# Patient Record
Sex: MALE | Race: WHITE | NOT HISPANIC OR LATINO | Employment: FULL TIME | URBAN - METROPOLITAN AREA
[De-identification: names, ages, dates, MRNs, and addresses within clinical notes are randomized per-mention and may not be internally consistent; named-entity substitution may affect disease eponyms.]

---

## 2020-06-09 ENCOUNTER — OFFICE VISIT (OUTPATIENT)
Dept: INTERNAL MEDICINE CLINIC | Facility: CLINIC | Age: 25
End: 2020-06-09
Payer: COMMERCIAL

## 2020-06-09 VITALS
HEART RATE: 75 BPM | SYSTOLIC BLOOD PRESSURE: 110 MMHG | DIASTOLIC BLOOD PRESSURE: 68 MMHG | WEIGHT: 205 LBS | TEMPERATURE: 97.8 F | BODY MASS INDEX: 30.36 KG/M2 | HEIGHT: 69 IN | OXYGEN SATURATION: 100 %

## 2020-06-09 DIAGNOSIS — R10.11 RIGHT UPPER QUADRANT ABDOMINAL PAIN: ICD-10-CM

## 2020-06-09 DIAGNOSIS — Z11.4 SCREENING FOR HIV (HUMAN IMMUNODEFICIENCY VIRUS): ICD-10-CM

## 2020-06-09 DIAGNOSIS — R51.9 FACIAL PAIN: Primary | ICD-10-CM

## 2020-06-09 PROCEDURE — 99243 OFF/OP CNSLTJ NEW/EST LOW 30: CPT | Performed by: INTERNAL MEDICINE

## 2020-06-09 PROCEDURE — 1036F TOBACCO NON-USER: CPT | Performed by: INTERNAL MEDICINE

## 2020-06-09 PROCEDURE — 3008F BODY MASS INDEX DOCD: CPT | Performed by: INTERNAL MEDICINE

## 2020-06-09 RX ORDER — SERTRALINE HYDROCHLORIDE 100 MG/1
1 TABLET, FILM COATED ORAL DAILY
COMMUNITY
Start: 2020-01-02

## 2020-06-10 ENCOUNTER — TRANSCRIBE ORDERS (OUTPATIENT)
Dept: ADMINISTRATIVE | Facility: HOSPITAL | Age: 25
End: 2020-06-10

## 2020-06-11 LAB
ALBUMIN SERPL-MCNC: 4.5 G/DL (ref 4.1–5.2)
ALBUMIN/GLOB SERPL: 1.8 {RATIO} (ref 1.2–2.2)
ALP SERPL-CCNC: 106 IU/L (ref 39–117)
ALT SERPL-CCNC: 29 IU/L (ref 0–44)
AST SERPL-CCNC: 21 IU/L (ref 0–40)
BASOPHILS # BLD AUTO: 0.1 X10E3/UL (ref 0–0.2)
BASOPHILS NFR BLD AUTO: 1 %
BILIRUB SERPL-MCNC: 0.3 MG/DL (ref 0–1.2)
BUN SERPL-MCNC: 19 MG/DL (ref 6–20)
BUN/CREAT SERPL: 18 (ref 9–20)
CALCIUM SERPL-MCNC: 9.5 MG/DL (ref 8.7–10.2)
CHLORIDE SERPL-SCNC: 100 MMOL/L (ref 96–106)
CHOLEST SERPL-MCNC: 189 MG/DL (ref 100–199)
CO2 SERPL-SCNC: 24 MMOL/L (ref 20–29)
CREAT SERPL-MCNC: 1.03 MG/DL (ref 0.76–1.27)
EOSINOPHIL # BLD AUTO: 0.2 X10E3/UL (ref 0–0.4)
EOSINOPHIL NFR BLD AUTO: 3 %
ERYTHROCYTE [DISTWIDTH] IN BLOOD BY AUTOMATED COUNT: 13.7 % (ref 11.6–15.4)
GLOBULIN SER-MCNC: 2.5 G/DL (ref 1.5–4.5)
GLUCOSE SERPL-MCNC: 94 MG/DL (ref 65–99)
HBA1C MFR BLD: 5.7 % (ref 4.8–5.6)
HCT VFR BLD AUTO: 44.3 % (ref 37.5–51)
HDLC SERPL-MCNC: 65 MG/DL
HGB BLD-MCNC: 14.7 G/DL (ref 13–17.7)
HIV 1+2 AB+HIV1 P24 AG SERPL QL IA: NON REACTIVE
IMM GRANULOCYTES # BLD: 0 X10E3/UL (ref 0–0.1)
IMM GRANULOCYTES NFR BLD: 0 %
LDLC SERPL CALC-MCNC: 103 MG/DL (ref 0–99)
LYMPHOCYTES # BLD AUTO: 2.4 X10E3/UL (ref 0.7–3.1)
LYMPHOCYTES NFR BLD AUTO: 33 %
MCH RBC QN AUTO: 28.4 PG (ref 26.6–33)
MCHC RBC AUTO-ENTMCNC: 33.2 G/DL (ref 31.5–35.7)
MCV RBC AUTO: 86 FL (ref 79–97)
MONOCYTES # BLD AUTO: 0.6 X10E3/UL (ref 0.1–0.9)
MONOCYTES NFR BLD AUTO: 8 %
NEUTROPHILS # BLD AUTO: 4 X10E3/UL (ref 1.4–7)
NEUTROPHILS NFR BLD AUTO: 55 %
PLATELET # BLD AUTO: 241 X10E3/UL (ref 150–450)
POTASSIUM SERPL-SCNC: 4.5 MMOL/L (ref 3.5–5.2)
PROT SERPL-MCNC: 7 G/DL (ref 6–8.5)
RBC # BLD AUTO: 5.17 X10E6/UL (ref 4.14–5.8)
SL AMB EGFR AFRICAN AMERICAN: 115 ML/MIN/1.73
SL AMB EGFR NON AFRICAN AMERICAN: 100 ML/MIN/1.73
SL AMB VLDL CHOLESTEROL CALC: 21 MG/DL (ref 5–40)
SODIUM SERPL-SCNC: 139 MMOL/L (ref 134–144)
TRIGL SERPL-MCNC: 107 MG/DL (ref 0–149)
WBC # BLD AUTO: 7.3 X10E3/UL (ref 3.4–10.8)

## 2020-06-16 ENCOUNTER — TELEPHONE (OUTPATIENT)
Dept: INTERNAL MEDICINE CLINIC | Facility: CLINIC | Age: 25
End: 2020-06-16

## 2022-04-07 ENCOUNTER — OFFICE VISIT (OUTPATIENT)
Dept: FAMILY MEDICINE CLINIC | Facility: CLINIC | Age: 27
End: 2022-04-07
Payer: COMMERCIAL

## 2022-04-07 VITALS
HEIGHT: 69 IN | HEART RATE: 88 BPM | DIASTOLIC BLOOD PRESSURE: 89 MMHG | SYSTOLIC BLOOD PRESSURE: 128 MMHG | RESPIRATION RATE: 18 BRPM | TEMPERATURE: 97.7 F | BODY MASS INDEX: 30.96 KG/M2 | WEIGHT: 209 LBS | OXYGEN SATURATION: 98 %

## 2022-04-07 DIAGNOSIS — J01.90 ACUTE SINUSITIS, RECURRENCE NOT SPECIFIED, UNSPECIFIED LOCATION: Primary | ICD-10-CM

## 2022-04-07 PROCEDURE — 3725F SCREEN DEPRESSION PERFORMED: CPT | Performed by: FAMILY MEDICINE

## 2022-04-07 PROCEDURE — 99203 OFFICE O/P NEW LOW 30 MIN: CPT | Performed by: FAMILY MEDICINE

## 2022-04-07 RX ORDER — AMOXICILLIN 875 MG/1
875 TABLET, COATED ORAL 2 TIMES DAILY
Qty: 14 TABLET | Refills: 0 | Status: SHIPPED | OUTPATIENT
Start: 2022-04-07 | End: 2022-04-14

## 2022-04-07 NOTE — PROGRESS NOTES
Assessment/Plan:     Diagnoses and all orders for this visit:    Acute sinusitis, recurrence not specified, unspecified location  -     amoxicillin (AMOXIL) 875 mg tablet; Take 1 tablet (875 mg total) by mouth 2 (two) times a day for 7 days  - Counseled on supportive care including rest, hydration, salt water gargles, OTC decongestant and nasal spray  Subjective:      Patient ID: Ailyn Mckee is a 31 y o  male  URI   This is a new problem  The current episode started yesterday  The problem has been gradually worsening  There has been no fever  Associated symptoms include congestion, headaches, a plugged ear sensation, sinus pain, sneezing, a sore throat and swollen glands  Pertinent negatives include no abdominal pain, chest pain, coughing, diarrhea, dysuria, ear pain, joint pain, joint swelling, nausea, neck pain, rash, rhinorrhea, vomiting or wheezing  He has tried acetaminophen for the symptoms  The treatment provided no relief  The following portions of the patient's history were reviewed and updated as appropriate: allergies, current medications, past family history, past medical history, past social history, past surgical history and problem list     Review of Systems   HENT: Positive for congestion, sinus pain, sneezing and sore throat  Negative for ear pain and rhinorrhea  Respiratory: Negative for cough and wheezing  Cardiovascular: Negative for chest pain  Gastrointestinal: Negative for abdominal pain, diarrhea, nausea and vomiting  Genitourinary: Negative for dysuria  Musculoskeletal: Negative for joint pain and neck pain  Skin: Negative for rash  Neurological: Positive for headaches  Objective:      /92   Pulse 88   Temp 97 7 °F (36 5 °C)   Resp 18   Ht 5' 9" (1 753 m)   Wt 94 8 kg (209 lb)   SpO2 98%   BMI 30 86 kg/m²          Physical Exam  Constitutional:       General: He is not in acute distress  Appearance: He is well-developed   He is not diaphoretic  HENT:      Head: Normocephalic and atraumatic  Right Ear: Tympanic membrane and ear canal normal  No drainage, swelling or tenderness  No middle ear effusion  Tympanic membrane is not erythematous  Left Ear: Tympanic membrane and ear canal normal  No drainage, swelling or tenderness  No middle ear effusion  Tympanic membrane is not erythematous  Nose: No congestion or rhinorrhea  Mouth/Throat:      Mouth: Mucous membranes are moist  No oral lesions  Pharynx: No pharyngeal swelling, oropharyngeal exudate, posterior oropharyngeal erythema or uvula swelling  Eyes:      General: No scleral icterus  Right eye: No discharge  Left eye: No discharge  Conjunctiva/sclera: Conjunctivae normal    Cardiovascular:      Rate and Rhythm: Normal rate and regular rhythm  Pulses: Normal pulses  Pulmonary:      Effort: Pulmonary effort is normal  No respiratory distress  Breath sounds: Normal breath sounds  No stridor  No wheezing, rhonchi or rales  Chest:      Chest wall: No tenderness  Musculoskeletal:         General: Normal range of motion  Cervical back: Normal range of motion  Skin:     General: Skin is warm  Neurological:      Mental Status: He is alert and oriented to person, place, and time  Psychiatric:         Mood and Affect: Mood normal          Behavior: Behavior normal          Thought Content:  Thought content normal          Judgment: Judgment normal

## 2022-04-27 ENCOUNTER — OFFICE VISIT (OUTPATIENT)
Dept: FAMILY MEDICINE CLINIC | Facility: CLINIC | Age: 27
End: 2022-04-27
Payer: COMMERCIAL

## 2022-04-27 VITALS
OXYGEN SATURATION: 96 % | WEIGHT: 212 LBS | TEMPERATURE: 98.7 F | SYSTOLIC BLOOD PRESSURE: 122 MMHG | BODY MASS INDEX: 31.4 KG/M2 | HEART RATE: 100 BPM | HEIGHT: 69 IN | RESPIRATION RATE: 16 BRPM | DIASTOLIC BLOOD PRESSURE: 80 MMHG

## 2022-04-27 DIAGNOSIS — B34.9 VIRAL INFECTION, UNSPECIFIED: Primary | ICD-10-CM

## 2022-04-27 PROCEDURE — 87636 SARSCOV2 & INF A&B AMP PRB: CPT | Performed by: FAMILY MEDICINE

## 2022-04-27 PROCEDURE — 1036F TOBACCO NON-USER: CPT | Performed by: FAMILY MEDICINE

## 2022-04-27 PROCEDURE — 3008F BODY MASS INDEX DOCD: CPT | Performed by: FAMILY MEDICINE

## 2022-04-27 PROCEDURE — 99213 OFFICE O/P EST LOW 20 MIN: CPT | Performed by: FAMILY MEDICINE

## 2022-04-27 NOTE — PROGRESS NOTES
COVID-19 Outpatient Progress Note    Assessment/Plan:    Problem List Items Addressed This Visit     None      Visit Diagnoses     Viral infection, unspecified    -  Primary    Relevant Orders    Covid/Flu- Office Collect         Disposition:     PCR testing will be performed to test for COVID-19/Influenza  Patient has COVID-19 infection  Based off CDC guidelines, they were recommended to isolate for 5 days from the date of the positive test  If they remain asymptomatic, isolation may be ended followed by 5 days of wearing a mask when around othes to minimize risk of infecting others  If they have a fever, continue to stay home until fever resolves for at least 24 hours  I have spent 15 minutes directly with the patient  Encounter provider Junie Carreno MD    Provider located at  O  [address] [address]    Recent Visits  No visits were found meeting these conditions  Showing recent visits within past 7 days and meeting all other requirements  Today's Visits  Date Type Provider Dept   04/27/22 Office Visit Junie Carreno MD [facility] today's visits and meeting all other requirements  Future Appointments  No visits were found meeting these conditions  Showing future appointments within next 150 days and meeting all other requirements     Subjective:   Azalea Aburto is a 31 y o  male who is concerned about COVID-19  Patient's symptoms include fever, chills, fatigue, malaise, nasal congestion, rhinorrhea, sore throat, diarrhea, myalgias and headache   Patient denies anosmia, loss of taste, cough, shortness of breath, chest tightness, abdominal pain, nausea and vomiting      - Date of symptom onset: 4/26/2022      COVID-19 vaccination status: Fully vaccinated (primary series)    Exposure:   Contact with a person who is under investigation (PUI) for or who is positive for COVID-19 within the last 14 days?: Yes    Hospitalized recently for fever and/or lower respiratory symptoms?: No      Currently a healthcare worker that is involved in direct patient care?: No      Works in a special setting where the risk of COVID-19 transmission may be high? (this may include long-term care, correctional and care home facilities; homeless shelters; assisted-living facilities and group homes ): No      Resident in a special setting where the risk of COVID-19 transmission may be high? (this may include long-term care, correctional and care home facilities; homeless shelters; assisted-living facilities and group homes ): No      No results found for: [address] [facility] 98, [facility], [address] [facility] [address], [facility] 116, 350 [facility], 700 [facility]  Past Medical History:   Diagnosis Date    Anxiety      Past Surgical History:   Procedure Laterality Date    SINUS SURGERY       Current Outpatient Medications   Medication Sig Dispense Refill    sertraline (ZOLOFT) 100 mg tablet Take 1 tablet by mouth daily       No current facility-administered medications for this visit  No Known Allergies    Review of Systems   Constitutional: Positive for chills, fatigue and fever  HENT: Positive for congestion, rhinorrhea and sore throat  Respiratory: Negative for cough, chest tightness and shortness of breath  Gastrointestinal: Positive for diarrhea  Negative for abdominal pain, nausea and vomiting  Musculoskeletal: Positive for myalgias  Neurological: Positive for headaches  Objective:    Vitals:    04/27/22 0941   BP: 122/80   Pulse: 100   Resp: 16   Temp: 98 7 °F (37 1 °C)   SpO2: 96%   Weight: 96 2 kg (212 lb)   Height: 5' 9" (1 753 m)       Physical Exam  Constitutional:       General: He is not in acute distress  Appearance: Normal appearance  He is normal weight  He is not ill-appearing, toxic-appearing or diaphoretic  HENT:      Head: Normocephalic and atraumatic        Right Ear: Tympanic membrane, ear canal and external ear normal  There is no impacted cerumen  Left Ear: Tympanic membrane, ear canal and external ear normal  There is no impacted cerumen  Nose: Nose normal       Mouth/Throat:      Mouth: Mucous membranes are moist       Pharynx: Oropharynx is clear  No oropharyngeal exudate or posterior oropharyngeal erythema  Eyes:      General: No scleral icterus  Right eye: No discharge  Left eye: No discharge  Extraocular Movements: Extraocular movements intact  Conjunctiva/sclera: Conjunctivae normal       Pupils: Pupils are equal, round, and reactive to light  Cardiovascular:      Rate and Rhythm: Normal rate and regular rhythm  Pulses: Normal pulses  Heart sounds: Normal heart sounds  No murmur heard  No friction rub  No gallop  Pulmonary:      Effort: Pulmonary effort is normal  No respiratory distress  Breath sounds: Normal breath sounds  No stridor  No wheezing, rhonchi or rales  Chest:      Chest wall: No tenderness  Musculoskeletal:         General: Normal range of motion  Skin:     General: Skin is warm and dry  Neurological:      General: No focal deficit present  Mental Status: He is alert and oriented to person, place, and time  VIRTUAL VISIT DISCLAIMER    Mitch Kidd verbally agrees to participate in [employer]  Pt is aware that [employer] could be limited without vital signs or the ability to perform a full hands-on physical Farooq Dolly understands he or the provider may request at any time to terminate the video visit and request the patient to seek care or treatment in person

## 2022-04-28 ENCOUNTER — TELEPHONE (OUTPATIENT)
Dept: FAMILY MEDICINE CLINIC | Facility: CLINIC | Age: 27
End: 2022-04-28

## 2022-04-28 LAB
FLUAV RNA RESP QL NAA+PROBE: NEGATIVE
FLUBV RNA RESP QL NAA+PROBE: NEGATIVE
SARS-COV-2 RNA RESP QL NAA+PROBE: POSITIVE

## 2022-04-28 NOTE — TELEPHONE ENCOUNTER
Patient called and was given result for covid  Was told to quarantine for 5 day of onset symptoms and wear a mask for the remainder 5 days when  Around other people  Patient ok with the request and was also told to call back if he develops symptoms    Viviane Barrera MA

## 2022-09-28 ENCOUNTER — OFFICE VISIT (OUTPATIENT)
Dept: FAMILY MEDICINE CLINIC | Facility: CLINIC | Age: 27
End: 2022-09-28
Payer: COMMERCIAL

## 2022-09-28 VITALS
OXYGEN SATURATION: 99 % | TEMPERATURE: 98.1 F | DIASTOLIC BLOOD PRESSURE: 60 MMHG | BODY MASS INDEX: 28.44 KG/M2 | RESPIRATION RATE: 16 BRPM | HEART RATE: 78 BPM | SYSTOLIC BLOOD PRESSURE: 118 MMHG | HEIGHT: 69 IN | WEIGHT: 192 LBS

## 2022-09-28 DIAGNOSIS — U07.1 COVID-19: Primary | ICD-10-CM

## 2022-09-28 LAB
SARS-COV-2 AG UPPER RESP QL IA: POSITIVE
VALID CONTROL: ABNORMAL

## 2022-09-28 PROCEDURE — 87811 SARS-COV-2 COVID19 W/OPTIC: CPT | Performed by: NURSE PRACTITIONER

## 2022-09-28 PROCEDURE — 99213 OFFICE O/P EST LOW 20 MIN: CPT | Performed by: NURSE PRACTITIONER

## 2022-09-28 NOTE — PROGRESS NOTES
Name: Raheem Oden      : 1995      MRN: [id number]  Encounter Provider: BRANDEN Laguna  Encounter Date: 2022   Encounter department: [address] [place] [address]     {There are no diagnoses linked to this encounter   (Refresh or delete this SmartLink)}       Subjective      HPI  Review of Systems    Current Outpatient Medications on File Prior to Visit   Medication Sig    sertraline (ZOLOFT) 100 mg tablet Take 1 tablet by mouth daily       Objective     /60   Pulse 78   Temp 98 1 °F (36 7 °C)   Resp 16   Ht 5' 9" (1 753 m)   Wt 87 1 kg (192 lb)   SpO2 99%   BMI 28 35 kg/m²     Physical Exam  BRANDEN Bradley

## 2022-09-28 NOTE — LETTER
September 28, 2022     Patient: Sunshine Gonzalez  YOB: 1995  Date of Visit: 9/28/2022      To Whom it May Concern:    Sunshine Gonzalez is under my professional care  Oswald Keys was seen in my office on 9/28/2022 for Covid 19 infection  Please excuse from work      If you have any questions or concerns, please don't hesitate to call           Sincerely,          BRANDEN Ellis        CC: No Recipients

## 2022-09-28 NOTE — PROGRESS NOTES
COVID-19 Outpatient Progress Note    Assessment/Plan:    Problem List Items Addressed This Visit    None     Visit Diagnoses     COVID-19    -  Primary    Relevant Orders    Poct Covid 19 Rapid Antigen Test (Completed)         Recent Results (from the past 24 hour(s))   Poct Covid 19 Rapid Antigen Test    Collection Time: 09/28/22 12:14 PM   Result Value Ref Range    POCT SARS-CoV-2 Ag Positive (A) Negative    VALID CONTROL Valid        Disposition:     Rapid antigen testing was performed and the result is POSITIVE for COVID-19  Patient has asymptomatic or mild COVID-19 infection  Based off CDC guidelines, they were recommended to isolate for 5 days  If they are asymptomatic or symptoms are improving with no fevers in the past 24 hours, isolation may be ended followed by 5 days of wearing a mask when around othes to minimize risk of infecting others  If still have a fever or other symptoms have not improved, continue to isolate until they improve  Regardless of when they end isolation, avoid being around people who are more likely to get very sick from COVID-19 until at least day 11  Discussed symptom directed medication options with patient  Discussed vitamin D, vitamin C, and/or zinc supplementation with patient  I have spent 13 minutes directly with the patient  Reviewed symptomatic treatment  F/u for any persistent/worsening symptoms        Encounter provider: BRANDEN Mahoney     Provider located at: [facility] [address] [address]     Recent Visits  No visits were found meeting these conditions  Showing recent visits within past 7 days and meeting all other requirements  Today's Visits  Date Type Provider Dept   09/28/22 Office Visit Domingo Mahoney today's visits and meeting all other requirements  Future Appointments  No visits were found meeting these conditions    Showing future appointments within next 150 days and meeting all other requirements     Subjective:   Gonzales Hernandez is a 28 y o  male who is concerned about COVID-19  Patient's symptoms include chills, fatigue, nasal congestion, sore throat and headache  Patient denies fever, malaise, rhinorrhea, anosmia, loss of taste, cough, shortness of breath, chest tightness, abdominal pain, nausea, vomiting, diarrhea and myalgias  - Date of symptom onset: 9/26/2022      COVID-19 vaccination status: Fully vaccinated (primary series)    Exposure:   Contact with a person who is under investigation (PUI) for or who is positive for COVID-19 within the last 14 days?: Yes    Hospitalized recently for fever and/or lower respiratory symptoms?: No      Currently a healthcare worker that is involved in direct patient care?: No      Works in a special setting where the risk of COVID-19 transmission may be high? (this may include long-term care, correctional and [facility] facilities; homeless shelters; assisted-living facilities and group homes ): No      Resident in a special setting where the risk of COVID-19 transmission may be high? (this may include long-term care, correctional and [facility] facilities; homeless shelters; assisted-living facilities and group homes ): No      Was at a concert prior to symptom onset  Has had Covid twice before    Lab Results   Component Value Date    SARSCOV2 Positive (A) 04/27/2022    SARSCOVAG Positive (A) 09/28/2022       Review of Systems   Constitutional: Positive for chills and fatigue  Negative for fever  HENT: Positive for congestion and sore throat  Negative for rhinorrhea  Respiratory: Negative for cough, chest tightness and shortness of breath  Gastrointestinal: Negative for abdominal pain, diarrhea, nausea and vomiting  Musculoskeletal: Negative for myalgias  Neurological: Positive for headaches       Current Outpatient Medications on File Prior to Visit   Medication Sig    sertraline (ZOLOFT) 100 mg tablet Take 1 tablet by mouth daily       Objective:    /60   Pulse 78   Temp 98 1 °F (36 7 °C)   Resp 16   Ht 5' 9" (1 753 m)   Wt 87 1 kg (192 lb)   SpO2 99%   BMI 28 35 kg/m²      Physical Exam  Vitals and nursing note reviewed  Constitutional:       Appearance: Normal appearance  He is well-developed  HENT:      Head: Normocephalic and atraumatic  Eyes:      Conjunctiva/sclera: Conjunctivae normal    Cardiovascular:      Rate and Rhythm: Normal rate and regular rhythm  Heart sounds: No murmur heard  Pulmonary:      Effort: Pulmonary effort is normal  No respiratory distress  Breath sounds: Normal breath sounds  Abdominal:      Palpations: Abdomen is soft  Tenderness: There is no abdominal tenderness  Musculoskeletal:      Cervical back: Neck supple  Skin:     General: Skin is warm and dry  Neurological:      Mental Status: He is alert     Psychiatric:         Mood and Affect: Mood normal          Behavior: Behavior normal        BRADNEN Beckett

## 2023-08-10 ENCOUNTER — OFFICE VISIT (OUTPATIENT)
Dept: FAMILY MEDICINE CLINIC | Facility: CLINIC | Age: 28
End: 2023-08-10
Payer: COMMERCIAL

## 2023-08-10 VITALS
WEIGHT: 208 LBS | TEMPERATURE: 97.6 F | SYSTOLIC BLOOD PRESSURE: 120 MMHG | HEIGHT: 69 IN | HEART RATE: 85 BPM | RESPIRATION RATE: 16 BRPM | BODY MASS INDEX: 30.81 KG/M2 | OXYGEN SATURATION: 95 % | DIASTOLIC BLOOD PRESSURE: 72 MMHG

## 2023-08-10 DIAGNOSIS — J06.9 UPPER RESPIRATORY TRACT INFECTION, UNSPECIFIED TYPE: Primary | ICD-10-CM

## 2023-08-10 LAB
SARS-COV-2 AG UPPER RESP QL IA: NEGATIVE
VALID CONTROL: NORMAL

## 2023-08-10 PROCEDURE — 99213 OFFICE O/P EST LOW 20 MIN: CPT | Performed by: INTERNAL MEDICINE

## 2023-08-10 PROCEDURE — 87811 SARS-COV-2 COVID19 W/OPTIC: CPT | Performed by: INTERNAL MEDICINE

## 2023-08-10 RX ORDER — AMOXICILLIN 875 MG/1
875 TABLET, COATED ORAL 2 TIMES DAILY
Qty: 20 TABLET | Refills: 0 | Status: SHIPPED | OUTPATIENT
Start: 2023-08-10 | End: 2023-08-20

## 2023-08-10 NOTE — PROGRESS NOTES
Name: Divina [place]      : 1995      MRN: [id number]  Encounter Provider: Angela Jolley MD  Encounter Date: 8/10/2023   Encounter department: [address] [place] [address]     1. Upper respiratory tract infection, unspecified type  Comments:  Continue supportive care, follow up if not improving. Orders:  -     Poct Covid 19 Rapid Antigen Test  -     amoxicillin (AMOXIL) 875 mg tablet; Take 1 tablet (875 mg total) by mouth 2 (two) times a day for 10 days           Subjective      Has been sick for the past week. Has headaches, feels achey, throat is very sore, ears hurt. There is no cough or dyspnea. Taking robitussin, dayquil, nyquil without relief. Review of Systems   Constitutional: Positive for fatigue. Negative for fever. HENT: Positive for congestion and sore throat. Negative for rhinorrhea and sinus pressure. Respiratory: Negative for cough, shortness of breath and wheezing. Current Outpatient Medications on File Prior to Visit   Medication Sig   • sertraline (ZOLOFT) 100 mg tablet Take 1 tablet by mouth daily       Objective     /72 (BP Location: Right arm, Patient Position: Sitting, Cuff Size: Large)   Pulse 85   Temp 97.6 °F (36.4 °C) (Temporal)   Resp 16   Ht 5' 9" (1.753 m)   Wt 94.3 kg (208 lb)   SpO2 95%   BMI 30.72 kg/m²     Physical Exam  Constitutional:       Appearance: He is well-developed. HENT:      Right Ear: Tympanic membrane normal.      Left Ear: Tympanic membrane normal.      Nose: Congestion present. Mouth/Throat: Tonsils: Tonsillar exudate present. 1+ on the right. 1+ on the left. Eyes:      Conjunctiva/sclera: Conjunctivae normal.   Neck:      Thyroid: No thyromegaly. Vascular: No JVD. Cardiovascular:      Rate and Rhythm: Normal rate and regular rhythm. Heart sounds: Normal heart sounds. No murmur heard. No friction rub. No gallop.    Pulmonary:      Effort: Pulmonary effort is normal.      Breath sounds: Normal breath sounds. No wheezing or rales. Musculoskeletal:      Cervical back: Neck supple.        Rk Jaramillo MD

## 2023-08-23 NOTE — TELEPHONE ENCOUNTER
Has he been taking this medication regularly until now? I don't see any refills over the past 3 years. If he has been off of it for a while, he may have to restart on a lower dose.

## 2023-08-24 RX ORDER — SERTRALINE HYDROCHLORIDE 100 MG/1
100 TABLET, FILM COATED ORAL DAILY
OUTPATIENT
Start: 2023-08-24

## 2024-05-06 ENCOUNTER — OFFICE VISIT (OUTPATIENT)
Dept: URGENT CARE | Facility: CLINIC | Age: 29
End: 2024-05-06
Payer: COMMERCIAL

## 2024-05-06 VITALS
WEIGHT: 199 LBS | SYSTOLIC BLOOD PRESSURE: 116 MMHG | BODY MASS INDEX: 29.39 KG/M2 | HEART RATE: 66 BPM | DIASTOLIC BLOOD PRESSURE: 88 MMHG | OXYGEN SATURATION: 100 % | TEMPERATURE: 97 F | RESPIRATION RATE: 16 BRPM

## 2024-05-06 DIAGNOSIS — B34.9 VIRAL INFECTION: Primary | ICD-10-CM

## 2024-05-06 LAB — S PYO AG THROAT QL: NEGATIVE

## 2024-05-06 PROCEDURE — 87880 STREP A ASSAY W/OPTIC: CPT | Performed by: PHYSICIAN ASSISTANT

## 2024-05-06 PROCEDURE — 99213 OFFICE O/P EST LOW 20 MIN: CPT | Performed by: PHYSICIAN ASSISTANT

## 2024-05-06 PROCEDURE — 87636 SARSCOV2 & INF A&B AMP PRB: CPT | Performed by: PHYSICIAN ASSISTANT

## 2024-05-06 NOTE — PROGRESS NOTES
[facility]        NAME: Heriberto Cooper is a 28 y.o. male  : 1995    MRN: [id number]  DATE: May 6, 2024  TIME: 6:42 PM    Assessment and Plan   Viral infection [B34.9]  1. Viral infection  Covid/Flu-Office Collect    POCT rapid ANTIGEN strepA        I did offer the patient a work note but he does not need one.     Patient Instructions     Patient Instructions   A sinus infection is most often caused by a virus. Treatment for a sinus infection focuses on symptomatic management as it typically resolves within 7 to 10 days. There are no treatments to shorten the clinical course of the disease. Patients with a viral sinus infection should be managed with supportive care only. Bacterial infection occurs in only 0.5 to 2 percent of episodes of sinus infections. Acute bacterial sinus infections may also be a self-limited disease and resolve without antibiotics. However, if your symptoms do last past 7 days you may call the office back and I will send in an antibiotic for you.       According to the American Academy of Otolaryngology- Head and Neck Surgery patients with a sinus infection should have a seven day waiting period with symptomatic management. Upon day seven if there is no improvement an antibiotic should then be initiated.         Symptomatic management:     - Nasal congestion: Nasal irrigation with nasal saline or intranasal glucocorticoids (FLONASE)           Short course of oral decongestants (Sudafed) 3-5 days           (Guaifenesin) may help thin secretions and may promote ease of mucus drainage and clearance           Inhalation of warm, humidified air (steam), may provide patients with a transient sense of relief of congestion                - Pain and Fever: Over-the-counter analgesics and antipyretics such as nonsteroidal antiinflammatory drugs (NSAIDs) and acetaminophen may be used                 Follow up with PCP in 3-5 days.  Proceed to  ER if symptoms worsen.    Chief Complaint      Chief Complaint   Patient presents with    Cold Like Symptoms     Pt presents with back pain, headaches, sinus pressure, sore throat yesterday;          History of Present Illness       The patient is a 28-year-old male presenting today for myalgias, headaches, sinus pressure and a sore throat that began yesterday.  Reports that symptoms hit him suddenly.  Denies any fevers or chills.  Denies headaches.  He was at a concert a few days ago.        Review of Systems   Review of Systems   Constitutional:  Negative for activity change, appetite change, chills, diaphoresis and fever.   HENT:  Positive for congestion, sinus pressure and sore throat. Negative for ear pain and rhinorrhea.    Eyes:  Negative for pain and visual disturbance.   Respiratory:  Negative for cough, chest tightness and shortness of breath.    Cardiovascular:  Negative for chest pain and palpitations.   Gastrointestinal:  Negative for abdominal pain, diarrhea, nausea and vomiting.   Genitourinary:  Negative for dysuria and hematuria.   Musculoskeletal:  Positive for myalgias. Negative for arthralgias and back pain.   Skin:  Negative for color change, pallor and rash.   Neurological:  Positive for headaches. Negative for seizures and syncope.   All other systems reviewed and are negative.        Current Medications       Current Outpatient Medications:     sertraline (ZOLOFT) 100 mg tablet, Take 1 tablet by mouth daily, Disp: , Rfl:     Current Allergies     Allergies as of 05/06/2024    (No Known Allergies)            The following portions of the patient's history were reviewed and updated as appropriate: allergies, current medications, past family history, past medical history, past social history, past surgical history and problem list.     Past Medical History:   Diagnosis Date    Anxiety        Past Surgical History:   Procedure Laterality Date    SINUS SURGERY         History reviewed. No pertinent family history.      Medications have  been verified.        Objective   /88   Pulse 66   Temp (!) 97 °F (36.1 °C)   Resp 16   Wt 90.3 kg (199 lb)   SpO2 100%   BMI 29.39 kg/m²        Physical Exam     Physical Exam  Vitals and nursing note reviewed.   Constitutional:       General: He is not in acute distress.     Appearance: Normal appearance. He is normal weight. He is not ill-appearing, toxic-appearing or diaphoretic.   HENT:      Head: Normocephalic and atraumatic.      Right Ear: Tympanic membrane, ear canal and external ear normal. There is no impacted cerumen.      Left Ear: Tympanic membrane, ear canal and external ear normal. There is no impacted cerumen.      Nose: Nose normal. No congestion or rhinorrhea.      Mouth/Throat:      Mouth: Mucous membranes are moist.      Pharynx: Oropharynx is clear. No oropharyngeal exudate or posterior oropharyngeal erythema.   Eyes:      Extraocular Movements: Extraocular movements intact.      Conjunctiva/sclera: Conjunctivae normal.      Pupils: Pupils are equal, round, and reactive to light.   Cardiovascular:      Rate and Rhythm: Normal rate and regular rhythm.      Heart sounds: Normal heart sounds. No murmur heard.     No friction rub. No gallop.   Pulmonary:      Effort: Pulmonary effort is normal. No respiratory distress.      Breath sounds: Normal breath sounds. No stridor. No wheezing, rhonchi or rales.   Chest:      Chest wall: No tenderness.   Abdominal:      General: Abdomen is flat. Bowel sounds are normal. There is no distension.      Palpations: Abdomen is soft. There is no mass.      Tenderness: There is no abdominal tenderness. There is no guarding or rebound.      Hernia: No hernia is present.   Musculoskeletal:         General: Normal range of motion.      Cervical back: Normal range of motion.   Lymphadenopathy:      Cervical: No cervical adenopathy.   Skin:     General: Skin is warm and dry.      Capillary Refill: Capillary refill takes less than 2 seconds.   Neurological:       Mental Status: He is alert.

## 2024-05-06 NOTE — PATIENT INSTRUCTIONS
A sinus infection is most often caused by a virus. Treatment for a sinus infection focuses on symptomatic management as it typically resolves within 7 to 10 days. There are no treatments to shorten the clinical course of the disease. Patients with a viral sinus infection should be managed with supportive care only. Bacterial infection occurs in only 0.5 to 2 percent of episodes of sinus infections. Acute bacterial sinus infections may also be a self-limited disease and resolve without antibiotics. However, if your symptoms do last past 7 days you may call the office back and I will send in an antibiotic for you.       According to the [employer]- [employer] patients with a sinus infection should have a seven day waiting period with symptomatic management. Upon day seven if there is no improvement an antibiotic should then be initiated.         Symptomatic management:     - Nasal congestion: Nasal irrigation with nasal saline or intranasal glucocorticoids (FLONASE)           Short course of oral decongestants (Sudafed) 3-5 days           (Guaifenesin) may help thin secretions and may promote ease of mucus drainage and clearance           Inhalation of warm, humidified air (steam), may provide patients with a transient sense of relief of congestion                - Pain and Fever: Over-the-counter analgesics and antipyretics such as nonsteroidal antiinflammatory drugs (NSAIDs) and acetaminophen may be used

## 2024-05-09 LAB
FLUAV RNA RESP QL NAA+PROBE: NEGATIVE
FLUBV RNA RESP QL NAA+PROBE: NEGATIVE
SARS-COV-2 RNA RESP QL NAA+PROBE: NEGATIVE